# Patient Record
Sex: MALE | Race: BLACK OR AFRICAN AMERICAN | HISPANIC OR LATINO | Employment: OTHER | URBAN - METROPOLITAN AREA
[De-identification: names, ages, dates, MRNs, and addresses within clinical notes are randomized per-mention and may not be internally consistent; named-entity substitution may affect disease eponyms.]

---

## 2022-04-15 ENCOUNTER — APPOINTMENT (EMERGENCY)
Dept: CT IMAGING | Facility: HOSPITAL | Age: 62
End: 2022-04-15
Payer: MEDICARE

## 2022-04-15 ENCOUNTER — HOSPITAL ENCOUNTER (EMERGENCY)
Facility: HOSPITAL | Age: 62
Discharge: HOME | End: 2022-04-15
Attending: EMERGENCY MEDICINE
Payer: MEDICARE

## 2022-04-15 ENCOUNTER — APPOINTMENT (EMERGENCY)
Dept: RADIOLOGY | Facility: HOSPITAL | Age: 62
End: 2022-04-15
Payer: MEDICARE

## 2022-04-15 VITALS
TEMPERATURE: 98.7 F | DIASTOLIC BLOOD PRESSURE: 91 MMHG | SYSTOLIC BLOOD PRESSURE: 147 MMHG | HEART RATE: 73 BPM | RESPIRATION RATE: 18 BRPM | WEIGHT: 160.94 LBS | OXYGEN SATURATION: 100 %

## 2022-04-15 DIAGNOSIS — F19.90: ICD-10-CM

## 2022-04-15 DIAGNOSIS — G47.9 DIFFICULTY SLEEPING: ICD-10-CM

## 2022-04-15 DIAGNOSIS — R03.0 ELEVATED BLOOD PRESSURE READING: ICD-10-CM

## 2022-04-15 DIAGNOSIS — F32.A DEPRESSION: ICD-10-CM

## 2022-04-15 DIAGNOSIS — K21.9 ACID REFLUX: ICD-10-CM

## 2022-04-15 DIAGNOSIS — G89.29 CHRONIC PAIN: Primary | ICD-10-CM

## 2022-04-15 LAB
2HR DELTA HS TROPONIN: -2 NG/L
ALBUMIN SERPL BCP-MCNC: 3.4 G/DL (ref 3.5–5)
ALP SERPL-CCNC: 144 U/L (ref 46–116)
ALT SERPL W P-5'-P-CCNC: 33 U/L (ref 12–78)
ANION GAP SERPL CALCULATED.3IONS-SCNC: 8 MMOL/L (ref 4–13)
AST SERPL W P-5'-P-CCNC: 18 U/L (ref 5–45)
ATRIAL RATE: 67 BPM
ATRIAL RATE: 73 BPM
BASOPHILS # BLD AUTO: 0.04 THOUSANDS/ΜL (ref 0–0.1)
BASOPHILS NFR BLD AUTO: 1 % (ref 0–1)
BILIRUB DIRECT SERPL-MCNC: 0.15 MG/DL (ref 0–0.2)
BILIRUB SERPL-MCNC: 0.56 MG/DL (ref 0.2–1)
BILIRUB UR QL STRIP: NEGATIVE
BUN SERPL-MCNC: 19 MG/DL (ref 5–25)
CALCIUM SERPL-MCNC: 10.1 MG/DL (ref 8.3–10.1)
CARDIAC TROPONIN I PNL SERPL HS: 3 NG/L
CARDIAC TROPONIN I PNL SERPL HS: 5 NG/L
CHLORIDE SERPL-SCNC: 99 MMOL/L (ref 100–108)
CLARITY UR: CLEAR
CO2 SERPL-SCNC: 28 MMOL/L (ref 21–32)
COLOR UR: NORMAL
CREAT SERPL-MCNC: 1.3 MG/DL (ref 0.6–1.3)
EOSINOPHIL # BLD AUTO: 0.05 THOUSAND/ΜL (ref 0–0.61)
EOSINOPHIL NFR BLD AUTO: 1 % (ref 0–6)
ERYTHROCYTE [DISTWIDTH] IN BLOOD BY AUTOMATED COUNT: 12.2 % (ref 11.6–15.1)
GFR SERPL CREATININE-BSD FRML MDRD: 59 ML/MIN/1.73SQ M
GLUCOSE SERPL-MCNC: 141 MG/DL (ref 65–140)
GLUCOSE UR STRIP-MCNC: NEGATIVE MG/DL
HCT VFR BLD AUTO: 46.4 % (ref 36.5–49.3)
HGB BLD-MCNC: 15.5 G/DL (ref 12–17)
HGB UR QL STRIP.AUTO: NEGATIVE
IMM GRANULOCYTES # BLD AUTO: 0.03 THOUSAND/UL (ref 0–0.2)
IMM GRANULOCYTES NFR BLD AUTO: 0 % (ref 0–2)
KETONES UR STRIP-MCNC: NEGATIVE MG/DL
LEUKOCYTE ESTERASE UR QL STRIP: NEGATIVE
LYMPHOCYTES # BLD AUTO: 1.57 THOUSANDS/ΜL (ref 0.6–4.47)
LYMPHOCYTES NFR BLD AUTO: 20 % (ref 14–44)
MAGNESIUM SERPL-MCNC: 2 MG/DL (ref 1.6–2.6)
MCH RBC QN AUTO: 32.8 PG (ref 26.8–34.3)
MCHC RBC AUTO-ENTMCNC: 33.4 G/DL (ref 31.4–37.4)
MCV RBC AUTO: 98 FL (ref 82–98)
MONOCYTES # BLD AUTO: 0.4 THOUSAND/ΜL (ref 0.17–1.22)
MONOCYTES NFR BLD AUTO: 5 % (ref 4–12)
NEUTROPHILS # BLD AUTO: 5.89 THOUSANDS/ΜL (ref 1.85–7.62)
NEUTS SEG NFR BLD AUTO: 73 % (ref 43–75)
NITRITE UR QL STRIP: NEGATIVE
NRBC BLD AUTO-RTO: 0 /100 WBCS
NT-PROBNP SERPL-MCNC: 29 PG/ML
P AXIS: 48 DEGREES
P AXIS: 73 DEGREES
PH UR STRIP.AUTO: 7 [PH] (ref 4.5–8)
PLATELET # BLD AUTO: 255 THOUSANDS/UL (ref 149–390)
PMV BLD AUTO: 11.1 FL (ref 8.9–12.7)
POTASSIUM SERPL-SCNC: 4.4 MMOL/L (ref 3.5–5.3)
PR INTERVAL: 188 MS
PR INTERVAL: 194 MS
PROT SERPL-MCNC: 8.6 G/DL (ref 6.4–8.2)
PROT UR STRIP-MCNC: NEGATIVE MG/DL
QRS AXIS: 3 DEGREES
QRS AXIS: 9 DEGREES
QRSD INTERVAL: 82 MS
QRSD INTERVAL: 84 MS
QT INTERVAL: 368 MS
QT INTERVAL: 388 MS
QTC INTERVAL: 405 MS
QTC INTERVAL: 409 MS
RBC # BLD AUTO: 4.73 MILLION/UL (ref 3.88–5.62)
SODIUM SERPL-SCNC: 135 MMOL/L (ref 136–145)
SP GR UR STRIP.AUTO: 1.01 (ref 1–1.03)
T WAVE AXIS: 19 DEGREES
T WAVE AXIS: 41 DEGREES
TSH SERPL DL<=0.05 MIU/L-ACNC: 0.72 UIU/ML (ref 0.45–4.5)
UROBILINOGEN UR QL STRIP.AUTO: 1 E.U./DL
VENTRICULAR RATE: 67 BPM
VENTRICULAR RATE: 73 BPM
WBC # BLD AUTO: 7.98 THOUSAND/UL (ref 4.31–10.16)

## 2022-04-15 PROCEDURE — 85025 COMPLETE CBC W/AUTO DIFF WBC: CPT | Performed by: EMERGENCY MEDICINE

## 2022-04-15 PROCEDURE — 93010 ELECTROCARDIOGRAM REPORT: CPT | Performed by: INTERNAL MEDICINE

## 2022-04-15 PROCEDURE — 81003 URINALYSIS AUTO W/O SCOPE: CPT

## 2022-04-15 PROCEDURE — 80048 BASIC METABOLIC PNL TOTAL CA: CPT | Performed by: EMERGENCY MEDICINE

## 2022-04-15 PROCEDURE — 96361 HYDRATE IV INFUSION ADD-ON: CPT

## 2022-04-15 PROCEDURE — 84484 ASSAY OF TROPONIN QUANT: CPT | Performed by: EMERGENCY MEDICINE

## 2022-04-15 PROCEDURE — 99284 EMERGENCY DEPT VISIT MOD MDM: CPT

## 2022-04-15 PROCEDURE — G1004 CDSM NDSC: HCPCS

## 2022-04-15 PROCEDURE — 84443 ASSAY THYROID STIM HORMONE: CPT | Performed by: EMERGENCY MEDICINE

## 2022-04-15 PROCEDURE — 93005 ELECTROCARDIOGRAM TRACING: CPT

## 2022-04-15 PROCEDURE — 96360 HYDRATION IV INFUSION INIT: CPT

## 2022-04-15 PROCEDURE — 83880 ASSAY OF NATRIURETIC PEPTIDE: CPT | Performed by: EMERGENCY MEDICINE

## 2022-04-15 PROCEDURE — 71046 X-RAY EXAM CHEST 2 VIEWS: CPT

## 2022-04-15 PROCEDURE — 36415 COLL VENOUS BLD VENIPUNCTURE: CPT | Performed by: EMERGENCY MEDICINE

## 2022-04-15 PROCEDURE — 99284 EMERGENCY DEPT VISIT MOD MDM: CPT | Performed by: EMERGENCY MEDICINE

## 2022-04-15 PROCEDURE — 71260 CT THORAX DX C+: CPT

## 2022-04-15 PROCEDURE — 80076 HEPATIC FUNCTION PANEL: CPT | Performed by: EMERGENCY MEDICINE

## 2022-04-15 PROCEDURE — 83735 ASSAY OF MAGNESIUM: CPT | Performed by: EMERGENCY MEDICINE

## 2022-04-15 RX ORDER — AMLODIPINE BESYLATE 10 MG/1
10 TABLET ORAL DAILY
Qty: 30 TABLET | Refills: 0 | Status: SHIPPED | OUTPATIENT
Start: 2022-04-15

## 2022-04-15 RX ORDER — LISINOPRIL 20 MG/1
20 TABLET ORAL DAILY
Qty: 30 TABLET | Refills: 0 | Status: SHIPPED | OUTPATIENT
Start: 2022-04-15

## 2022-04-15 RX ORDER — FLUOXETINE 20 MG/1
20 TABLET, FILM COATED ORAL DAILY
Qty: 30 TABLET | Refills: 0 | Status: SHIPPED | OUTPATIENT
Start: 2022-04-15

## 2022-04-15 RX ORDER — FAMOTIDINE 20 MG/1
20 TABLET, FILM COATED ORAL DAILY
Qty: 30 TABLET | Refills: 0 | Status: SHIPPED | OUTPATIENT
Start: 2022-04-15

## 2022-04-15 RX ORDER — LANOLIN ALCOHOL/MO/W.PET/CERES
3 CREAM (GRAM) TOPICAL
Qty: 30 TABLET | Refills: 0 | Status: SHIPPED | OUTPATIENT
Start: 2022-04-15 | End: 2022-05-15

## 2022-04-15 RX ADMIN — IOHEXOL 85 ML: 350 INJECTION, SOLUTION INTRAVENOUS at 21:02

## 2022-04-15 RX ADMIN — SODIUM CHLORIDE 1000 ML: 0.9 INJECTION, SOLUTION INTRAVENOUS at 17:07

## 2022-04-15 NOTE — ED PROVIDER NOTES
History  Chief Complaint   Patient presents with    Pain     Patient's daugther reports, "This is my dad, I brought him from [place] because on the  he overdosed and he   He OD on pain medicine and was on a breathing tube, had a central line, and everything  Now he keeps asking me for pain medicine because everything hurts  "      63 YO male presents for evaluation of chronic pain and to assist with establishing care  Family helps with Hx, states Pt was admitted to a hospital in [facility] 10 days prior after an unintentional overdose  At that time he required intubation  Family has moved Pt to the area for continuing care  States he is constantly having pain, throughout the body as well as headache  Pt came to the area 2 days prior, he has medications until tomorrow  Pt has documentation from doctor in [facility] that is requesting psychiatry evaluation  Family states he often takes Tylenol PM, gabapentin and he has opioid pain medications  On arrival Pt complained of pain in the head since he woke in the hospital, additionally family has noted he has some rhonchi with breathing when sleeping  Family denies that overdose was an intent to harm, states he has issues with overmedicating his pain        History provided by:  Patient and relative   used: No    Medical Problem  Location:  Generalized  Quality:  Aching  Severity:  Moderate  Onset quality:  Unable to specify  Timing:  Constant  Progression:  Waxing and waning  Chronicity:  Chronic  Context:  Hx of chronic pain, recent overdose  Relieved by:  Pain medications  Associated symptoms: headaches and myalgias    Associated symptoms: no abdominal pain, no chest pain, no fever, no nausea, no rash, no shortness of breath and no vomiting        None       Past Medical History:   Diagnosis Date    Chronic pain     [facility]     [facility] mellitus ([facility]     Encephalopathy     Hypertension     Renal disorder        History reviewed  No pertinent surgical history  History reviewed  No pertinent family history  I have reviewed and agree with the history as documented  E-Cigarette/Vaping     E-Cigarette/Vaping Substances     Social History     Tobacco Use    Smoking status: Current Every Day Smoker     Packs/day: 1 00     Types: Cigarettes    Smokeless tobacco: Never Used   Substance Use Topics    Alcohol use: Yes    Drug use: Not Currently       Review of Systems   Constitutional: Negative for fever  HENT: Negative for dental problem  Eyes: Negative for visual disturbance  Respiratory: Negative for shortness of breath  Cardiovascular: Negative for chest pain  Gastrointestinal: Negative for abdominal pain, nausea and vomiting  Genitourinary: Negative for dysuria and frequency  Musculoskeletal: Positive for arthralgias and myalgias  Negative for neck pain and neck stiffness  Skin: Negative for rash  Neurological: Positive for headaches  Negative for dizziness, weakness and light-headedness  Psychiatric/Behavioral: Negative for agitation, behavioral problems and confusion  All other systems reviewed and are negative  Physical Exam  Physical Exam  Vitals and nursing note reviewed  Constitutional:       Appearance: He is well-developed  HENT:      Head: Normocephalic and atraumatic  Eyes:      Extraocular Movements: Extraocular movements intact  Cardiovascular:      Rate and Rhythm: Normal rate  Pulmonary:      Effort: Pulmonary effort is normal    Abdominal:      General: There is no distension  Musculoskeletal:         General: Normal range of motion  Cervical back: Normal range of motion  Skin:     Findings: No rash  Neurological:      Mental Status: He is alert and oriented to person, place, and time     Psychiatric:         Behavior: Behavior normal          Vital Signs  ED Triage Vitals [04/15/22 1622]   Temperature Pulse Respirations Blood Pressure SpO2   98 7 °F (37 1 °C) 84 18 (S) (!) 89/65 99 %      Temp Source Heart Rate Source Patient Position - Orthostatic VS BP Location FiO2 (%)   Oral Monitor Sitting Right arm --      Pain Score       10 - Worst Possible Pain           Vitals:    04/15/22 1622 04/15/22 1815 04/15/22 2112   BP: (S) (!) 89/65 118/74 147/91   Pulse: 84 68 73   Patient Position - Orthostatic VS: Sitting Lying Lying         Visual Acuity      ED Medications  Medications   sodium chloride 0 9 % bolus 1,000 mL (0 mL Intravenous Stopped 4/15/22 2000)   iohexol (OMNIPAQUE) 350 MG/ML injection (SINGLE-DOSE) 85 mL (85 mL Intravenous Given 4/15/22 2102)       Diagnostic Studies  Results Reviewed     Procedure Component Value Units Date/Time    Urine Macroscopic, POC [[id number]] Collected: 04/15/22 2108    Lab Status: Final result Specimen: Urine Updated: 04/15/22 2110     Color, UA Mary Jane     Clarity, UA Clear     pH, UA 7 0     Leukocytes, UA Negative     Nitrite, UA Negative     Protein, UA Negative mg/dl      Glucose, UA Negative mg/dl      Ketones, UA Negative mg/dl      Urobilinogen, UA 1 0 E U /dl      Bilirubin, UA Negative     Blood, UA Negative     Specific Gravity, UA 1 015    Narrative:      CLINITEK RESULT    HS Troponin I 2hr [[id number]]  (Normal) Collected: 04/15/22 1916    Lab Status: Final result Specimen: Blood from Arm, Right Updated: 04/15/22 2007     hs TnI 2hr 3 ng/L      Delta 2hr hsTnI -2 ng/L     Hepatic function panel [[id number]]  (Abnormal) Collected: 04/15/22 1708    Lab Status: Final result Specimen: Blood from Arm, Right Updated: 04/15/22 1739     Total Bilirubin 0 56 mg/dL      Bilirubin, Direct 0 15 mg/dL      Alkaline Phosphatase 144 U/L      AST 18 U/L      ALT 33 U/L      Total Protein 8 6 g/dL      Albumin 3 4 g/dL     TSH [[id number]]  (Normal) Collected: 04/15/22 1708    Lab Status: Final result Specimen: Blood from Arm, Right Updated: 04/15/22 1739     TSH 3RD GENERATON 0 718 uIU/mL     Narrative:      Patients undergoing fluorescein dye angiography may retain small amounts of fluorescein in the body for 48-72 hours post procedure  Samples containing fluorescein can produce falsely depressed TSH values  If the patient had this procedure,a specimen should be resubmitted post fluorescein clearance        Magnesium [[id number]]  (Normal) Collected: 04/15/22 1708    Lab Status: Final result Specimen: Blood from Arm, Right Updated: 04/15/22 1739     Magnesium 2 0 mg/dL     NT-BNP PRO [[id number]]  (Normal) Collected: 04/15/22 1708    Lab Status: Final result Specimen: Blood from Arm, Right Updated: 04/15/22 1739     NT-proBNP 29 pg/mL     HS Troponin 0hr (reflex protocol) [[id number]]  (Normal) Collected: 04/15/22 1708    Lab Status: Final result Specimen: Blood from Arm, Right Updated: 04/15/22 1736     hs TnI 0hr 5 ng/L     Basic metabolic panel [[id number]]  (Abnormal) Collected: 04/15/22 1708    Lab Status: Final result Specimen: Blood from Arm, Right Updated: 04/15/22 1734     Sodium 135 mmol/L      Potassium 4 4 mmol/L      Chloride 99 mmol/L      CO2 28 mmol/L      ANION GAP 8 mmol/L      BUN 19 mg/dL      Creatinine 1 30 mg/dL      Glucose 141 mg/dL      Calcium 10 1 mg/dL      eGFR 59 ml/min/1 73sq m     Narrative:      Shayna guidelines for Chronic Kidney Disease (CKD):     Stage 1 with normal or high GFR (GFR > 90 mL/min/1 73 square meters)    Stage 2 Mild CKD (GFR = 60-89 mL/min/1 73 square meters)    Stage 3A Moderate CKD (GFR = 45-59 mL/min/1 73 square meters)    Stage 3B Moderate CKD (GFR = 30-44 mL/min/1 73 square meters)    Stage 4 Severe CKD (GFR = 15-29 mL/min/1 73 square meters)    Stage 5 End Stage CKD (GFR <15 mL/min/1 73 square meters)  Note: GFR calculation is accurate only with a steady state creatinine    CBC and differential [[id number]] Collected: 04/15/22 1708    Lab Status: Final result Specimen: Blood from Arm, Right Updated: 04/15/22 1714     WBC 7 98 Thousand/uL RBC 4 73 Million/uL      Hemoglobin 15 5 g/dL      Hematocrit 46 4 %      MCV 98 fL      MCH 32 8 pg      MCHC 33 4 g/dL      RDW 12 2 %      MPV 11 1 fL      Platelets 947 Thousands/uL      nRBC 0 /100 WBCs      Neutrophils Relative 73 %      Immat GRANS % 0 %      Lymphocytes Relative 20 %      Monocytes Relative 5 %      Eosinophils Relative 1 %      Basophils Relative 1 %      Neutrophils Absolute 5 89 Thousands/µL      Immature Grans Absolute 0 03 Thousand/uL      Lymphocytes Absolute 1 57 Thousands/µL      Monocytes Absolute 0 40 Thousand/µL      Eosinophils Absolute 0 05 Thousand/µL      Basophils Absolute 0 04 Thousands/µL                  CT chest with contrast   Final Result by Yung Kelly MD (04/15 2145)      Bony changes involving the thoracic vertebral bodies and costovertebral junctions on the right correlate with chest x-ray finding  Workstation performed: [id number]         XR chest 2 views   Final Result by Cecy Sommers MD (04/15 2010)      The lungs are hyperinflated concerning for COPD with biapical fibrotic changes  There is asymmetric opacity of the right paramedian apex for which a CT chest is recommended for further evaluation (in the appropriate clinical setting)  The study was marked in [facility] for immediate notification  Workstation performed: [id number]                    Procedures  Procedures         ED Course                                             MDM  Number of Diagnoses or Management Options  Acid reflux: new and requires workup  Chronic pain: new and requires workup  Depression: new and requires workup  Difficulty sleeping: new and requires workup  Elevated blood pressure reading: new and requires workup  Misuse of analgesic: new and requires workup  Diagnosis management comments: 1  Pain - Pt has Hx of abusing his medications, recently severe overdose   Did discuss importance of establishing care in the area, especially with pain services due to complicated pain Hx  Will check ECG and troponin, CBC for anemia and leukocytosis, metabolic panel for electrolyte abnormalities and dehydration  Pt is running out of medications that were prescribed in [place], have reviewed medications Pt requires  Though he does not have insurance, all medications appear to be available on [facility]'s $4 list, did explain this to family and have written for 30 day supplies of each  Amount and/or Complexity of Data Reviewed  Clinical lab tests: ordered and reviewed  Tests in the radiology section of CPT®: ordered and reviewed  Obtain history from someone other than the patient: yes  Review and summarize past medical records: yes  Independent visualization of images, tracings, or specimens: yes    Patient Progress  Patient progress: stable      Disposition  Final diagnoses:   Chronic pain   Misuse of analgesic   Elevated blood pressure reading   Depression   Acid reflux   Difficulty sleeping     Time reflects when diagnosis was documented in both MDM as applicable and the Disposition within this note     Time User Action Codes Description Comment    4/15/2022  7:57 PM Marvin RUSSELL Add [G89 29] Chronic pain     4/15/2022  7:57 PM Marvin RUSSELL Add [F19 90] Misuse of analgesic     4/15/2022 10:02 PM Freeman Romero Add [R03 0] Elevated blood pressure reading     4/15/2022 10:02 PM Tyronza Alias Add Mcdowell Juan Jock  A] Depression     4/15/2022 10:02 PM Marvin RUSSELL Add [K21 9] Acid reflux     4/15/2022 10:05 PM Marvin RUSSELL Add [G47 9] Difficulty sleeping       ED Disposition     ED Disposition Condition Date/Time Comment    Discharge Stable Fri Apr 15, 2022  7:57 PM Laurie Bernal discharge to home/self care              Follow-up Information     Follow up With Specialties Details Why Contact Info    [facility]   [address] [id number]          Discharge Medication List as of 4/15/2022 10:06 PM      START taking these medications    Details   amLODIPine (NORVASC) 10 mg tablet Take 1 tablet (10 mg total) by mouth daily, Starting Fri 4/15/2022, Print      famotidine (PEPCID) 20 mg tablet Take 1 tablet (20 mg total) by mouth daily, Starting Fri 4/15/2022, Print      FLUoxetine (PROzac) 20 MG tablet Take 1 tablet (20 mg total) by mouth daily, Starting Fri 4/15/2022, Print      lisinopril (ZESTRIL) 20 mg tablet Take 1 tablet (20 mg total) by mouth daily, Starting Fri 4/15/2022, Print      melatonin 3 mg Take 1 tablet (3 mg total) by mouth daily at bedtime, Starting Fri 4/15/2022, Until Sun 5/15/2022, Print             No discharge procedures on file      PDMP Review     None          ED Provider  Electronically Signed by           Lula Pinto MD  04/16/22 2715

## 2022-04-16 LAB
ATRIAL RATE: 78 BPM
P AXIS: 50 DEGREES
PR INTERVAL: 168 MS
QRS AXIS: 32 DEGREES
QRSD INTERVAL: 92 MS
QT INTERVAL: 358 MS
QTC INTERVAL: 408 MS
T WAVE AXIS: 29 DEGREES
VENTRICULAR RATE: 78 BPM

## 2022-04-16 PROCEDURE — 93010 ELECTROCARDIOGRAM REPORT: CPT | Performed by: INTERNAL MEDICINE

## 2022-04-16 NOTE — DISCHARGE INSTRUCTIONS
The number for the pain specialists are included in these discharge instructions, call to be seen in the office for further evaluation and management  You have also been given follow up information for psychiatric services, call multiple numbers to make sure you get care soon